# Patient Record
Sex: FEMALE | Race: BLACK OR AFRICAN AMERICAN | Employment: FULL TIME | URBAN - METROPOLITAN AREA
[De-identification: names, ages, dates, MRNs, and addresses within clinical notes are randomized per-mention and may not be internally consistent; named-entity substitution may affect disease eponyms.]

---

## 2021-02-22 ENCOUNTER — HOSPITAL ENCOUNTER (OUTPATIENT)
Dept: GENERAL RADIOLOGY | Age: 56
Discharge: HOME | End: 2021-02-24
Payer: COMMERCIAL

## 2021-02-22 ENCOUNTER — HOSPITAL ENCOUNTER (OUTPATIENT)
Age: 56
Discharge: HOME | End: 2021-02-24
Payer: COMMERCIAL

## 2021-02-22 DIAGNOSIS — R05.9 COUGH: ICD-10-CM

## 2021-02-22 PROCEDURE — 71046 X-RAY EXAM CHEST 2 VIEWS: CPT

## 2022-08-27 ENCOUNTER — APPOINTMENT (OUTPATIENT)
Dept: ULTRASOUND IMAGING | Age: 57
End: 2022-08-27
Payer: COMMERCIAL

## 2022-08-27 ENCOUNTER — APPOINTMENT (OUTPATIENT)
Dept: GENERAL RADIOLOGY | Age: 57
End: 2022-08-27
Payer: COMMERCIAL

## 2022-08-27 ENCOUNTER — HOSPITAL ENCOUNTER (EMERGENCY)
Age: 57
Discharge: HOME | End: 2022-08-27
Attending: EMERGENCY MEDICINE
Payer: COMMERCIAL

## 2022-08-27 VITALS
BODY MASS INDEX: 37.67 KG/M2 | OXYGEN SATURATION: 98 % | HEART RATE: 60 BPM | DIASTOLIC BLOOD PRESSURE: 59 MMHG | SYSTOLIC BLOOD PRESSURE: 135 MMHG | RESPIRATION RATE: 16 BRPM | HEIGHT: 67 IN | TEMPERATURE: 97.6 F | WEIGHT: 240 LBS

## 2022-08-27 DIAGNOSIS — M54.32 LEFT SIDED SCIATICA: Primary | ICD-10-CM

## 2022-08-27 DIAGNOSIS — S16.1XXA STRAIN OF NECK MUSCLE, INITIAL ENCOUNTER: ICD-10-CM

## 2022-08-27 LAB
ANION GAP SERPL CALCULATED.3IONS-SCNC: 9 MMOL/L (ref 7–16)
BUN BLDV-MCNC: 17 MG/DL (ref 6–20)
CALCIUM SERPL-MCNC: 9.1 MG/DL (ref 8.6–10.2)
CHLORIDE BLD-SCNC: 106 MMOL/L (ref 98–107)
CO2: 27 MMOL/L (ref 22–29)
CREAT SERPL-MCNC: 1.1 MG/DL (ref 0.5–1)
D DIMER: 207 NG/ML DDU
EKG ATRIAL RATE: 59 BPM
EKG P AXIS: 8 DEGREES
EKG P-R INTERVAL: 168 MS
EKG Q-T INTERVAL: 414 MS
EKG QRS DURATION: 68 MS
EKG QTC CALCULATION (BAZETT): 409 MS
EKG R AXIS: 52 DEGREES
EKG T AXIS: 20 DEGREES
EKG VENTRICULAR RATE: 59 BPM
GFR AFRICAN AMERICAN: >60
GFR NON-AFRICAN AMERICAN: >60 ML/MIN/1.73
GLUCOSE BLD-MCNC: 91 MG/DL (ref 74–99)
HCT VFR BLD CALC: 40 % (ref 34–48)
HEMOGLOBIN: 12.9 G/DL (ref 11.5–15.5)
MCH RBC QN AUTO: 28.7 PG (ref 26–35)
MCHC RBC AUTO-ENTMCNC: 32.3 % (ref 32–34.5)
MCV RBC AUTO: 89.1 FL (ref 80–99.9)
PDW BLD-RTO: 12.7 FL (ref 11.5–15)
PLATELET # BLD: 224 E9/L (ref 130–450)
PMV BLD AUTO: 10.8 FL (ref 7–12)
POTASSIUM SERPL-SCNC: 4.4 MMOL/L (ref 3.5–5)
RBC # BLD: 4.49 E12/L (ref 3.5–5.5)
SODIUM BLD-SCNC: 142 MMOL/L (ref 132–146)
TROPONIN, HIGH SENSITIVITY: 8 NG/L (ref 0–9)
WBC # BLD: 5.5 E9/L (ref 4.5–11.5)

## 2022-08-27 PROCEDURE — 99285 EMERGENCY DEPT VISIT HI MDM: CPT

## 2022-08-27 PROCEDURE — 93005 ELECTROCARDIOGRAM TRACING: CPT | Performed by: EMERGENCY MEDICINE

## 2022-08-27 PROCEDURE — 96374 THER/PROPH/DIAG INJ IV PUSH: CPT

## 2022-08-27 PROCEDURE — 96372 THER/PROPH/DIAG INJ SC/IM: CPT

## 2022-08-27 PROCEDURE — 73030 X-RAY EXAM OF SHOULDER: CPT

## 2022-08-27 PROCEDURE — 85027 COMPLETE CBC AUTOMATED: CPT

## 2022-08-27 PROCEDURE — 71046 X-RAY EXAM CHEST 2 VIEWS: CPT

## 2022-08-27 PROCEDURE — 85378 FIBRIN DEGRADE SEMIQUANT: CPT

## 2022-08-27 PROCEDURE — 84484 ASSAY OF TROPONIN QUANT: CPT

## 2022-08-27 PROCEDURE — 6360000002 HC RX W HCPCS: Performed by: EMERGENCY MEDICINE

## 2022-08-27 PROCEDURE — 93971 EXTREMITY STUDY: CPT

## 2022-08-27 PROCEDURE — 80048 BASIC METABOLIC PNL TOTAL CA: CPT

## 2022-08-27 RX ORDER — LISINOPRIL AND HYDROCHLOROTHIAZIDE 12.5; 1 MG/1; MG/1
1 TABLET ORAL DAILY
COMMUNITY

## 2022-08-27 RX ORDER — KETOROLAC TROMETHAMINE 30 MG/ML
30 INJECTION, SOLUTION INTRAMUSCULAR; INTRAVENOUS ONCE
Status: COMPLETED | OUTPATIENT
Start: 2022-08-27 | End: 2022-08-27

## 2022-08-27 RX ORDER — CYCLOBENZAPRINE HCL 10 MG
10 TABLET ORAL 3 TIMES DAILY PRN
Qty: 21 TABLET | Refills: 0 | Status: SHIPPED | OUTPATIENT
Start: 2022-08-27 | End: 2022-09-06

## 2022-08-27 RX ORDER — PREDNISONE 10 MG/1
TABLET ORAL
Qty: 20 TABLET | Refills: 0 | Status: SHIPPED | OUTPATIENT
Start: 2022-08-27 | End: 2022-09-06

## 2022-08-27 RX ORDER — ORPHENADRINE CITRATE 30 MG/ML
60 INJECTION INTRAMUSCULAR; INTRAVENOUS ONCE
Status: COMPLETED | OUTPATIENT
Start: 2022-08-27 | End: 2022-08-27

## 2022-08-27 RX ADMIN — ORPHENADRINE CITRATE 60 MG: 30 INJECTION INTRAMUSCULAR; INTRAVENOUS at 08:41

## 2022-08-27 RX ADMIN — KETOROLAC TROMETHAMINE 30 MG: 30 INJECTION, SOLUTION INTRAMUSCULAR; INTRAVENOUS at 08:42

## 2022-08-27 ASSESSMENT — ENCOUNTER SYMPTOMS
DIARRHEA: 0
ABDOMINAL DISTENTION: 0
EYE PAIN: 0
NAUSEA: 0
SINUS PRESSURE: 0
VOMITING: 0
SORE THROAT: 0
EYE REDNESS: 0
WHEEZING: 0
BACK PAIN: 0
COUGH: 0
EYE DISCHARGE: 0
SHORTNESS OF BREATH: 0

## 2022-08-27 ASSESSMENT — PAIN DESCRIPTION - LOCATION
LOCATION: NECK;SHOULDER;LEG
LOCATION: NECK;SHOULDER;LEG

## 2022-08-27 ASSESSMENT — PAIN SCALES - GENERAL
PAINLEVEL_OUTOF10: 7
PAINLEVEL_OUTOF10: 7

## 2022-08-27 ASSESSMENT — PAIN DESCRIPTION - FREQUENCY: FREQUENCY: CONTINUOUS

## 2022-08-27 ASSESSMENT — PAIN DESCRIPTION - ORIENTATION
ORIENTATION: LEFT
ORIENTATION: LEFT

## 2022-08-27 ASSESSMENT — PAIN DESCRIPTION - DESCRIPTORS: DESCRIPTORS: ACHING;SHARP

## 2022-08-27 ASSESSMENT — PAIN - FUNCTIONAL ASSESSMENT: PAIN_FUNCTIONAL_ASSESSMENT: 0-10

## 2022-08-27 ASSESSMENT — PAIN DESCRIPTION - PAIN TYPE: TYPE: ACUTE PAIN

## 2022-08-27 NOTE — ED PROVIDER NOTES
Patient is a 62 y/o female who presents to the ED with left leg pain and left shoulder pain. Patient states she had onset of cramping in her left leg two days ago. She states that she has since had pain behind her left knee which shoots up her left leg. She also reports left shoulder pain after moving a case of water yesterday. She has had mild chest pain. She denies any shortness of breath. She states that she has taken Tylenol and Aleve and the pain has improved. Currently, her pain is 7/10. Somewhat sudden onset, persistent, moderate severity, radiating down the left leg. Review of Systems   Constitutional:  Negative for chills and fever. HENT:  Negative for ear pain, sinus pressure and sore throat. Eyes:  Negative for pain, discharge and redness. Respiratory:  Negative for cough, shortness of breath and wheezing. Cardiovascular:  Positive for chest pain. Gastrointestinal:  Negative for abdominal distention, diarrhea, nausea and vomiting. Genitourinary:  Negative for dysuria and frequency. Musculoskeletal:  Positive for arthralgias and myalgias. Negative for back pain. Skin:  Negative for rash and wound. Neurological:  Negative for weakness and headaches. Hematological:  Negative for adenopathy. All other systems reviewed and are negative. Physical Exam  Vitals and nursing note reviewed. Constitutional:       General: She is not in acute distress. HENT:      Head: Normocephalic and atraumatic. Right Ear: External ear normal.      Left Ear: External ear normal.      Nose: Nose normal.      Mouth/Throat:      Mouth: Mucous membranes are moist.   Eyes:      Conjunctiva/sclera: Conjunctivae normal.      Pupils: Pupils are equal, round, and reactive to light. Cardiovascular:      Rate and Rhythm: Normal rate and regular rhythm. Heart sounds: No murmur heard. Pulmonary:      Effort: Pulmonary effort is normal. No respiratory distress.       Breath sounds: Normal breath sounds. No stridor. No wheezing, rhonchi or rales. Abdominal:      General: Bowel sounds are normal. There is no distension. Palpations: Abdomen is soft. Tenderness: There is no abdominal tenderness. There is no guarding. Musculoskeletal:         General: Normal range of motion. Left shoulder: Normal. No swelling, deformity, tenderness or bony tenderness. Cervical back: Normal range of motion and neck supple. Left upper leg: Normal. No swelling, deformity, tenderness or bony tenderness. Left lower leg: No swelling, deformity, tenderness or bony tenderness. No edema. Skin:     General: Skin is warm and dry. Findings: No rash. Neurological:      Mental Status: She is alert and oriented to person, place, and time. Procedures     [facility]       ED Course as of 08/30/22 0209   Sat Aug 27, 2022   9481 Patient reports that she is had left foot leg. Back clear for a few days. Is from bending down and picking something up she reports. She is also had pain to the left musculature of the neck, the left upper arm and the left hand. This is new in the last day or 2. No recent travel or surgeries immobilizations, not a chemotherapy currently. 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 Currently awake and alert and oriented, normally takes ibuprofen or aspirin which helps for her discomfort. [SO]      ED Course User Index  [SO] Mliss Solders, DO        EKG:  Sinus bradycarida with ventricular rate of 59. TX interval, QRS duration and QT interval within normal range. Normal axis. No ST segment abnormalities to suggest acute ischemia.    6:52 AM EDT  I have signed this patient out to the oncoming physician, Dr. Ngoc Anthony. I have discussed the patient's initial exam, treatment and plan of care with the on coming physician. I have notified the patient / family of the change in treating physician and answered their questions to this point.       ED Course as of 08/30/22 4385   Sat Aug 27, 2022   0044 Patient reports that she is had left foot leg. Back clear for a few days. Is from bending down and picking something up she reports. She is also had pain to the left musculature of the neck, the left upper arm and the left hand. This is new in the last day or 2. No recent travel or surgeries immobilizations, not a chemotherapy currently. 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 Currently awake and alert and oriented, normally takes ibuprofen or aspirin which helps for her discomfort. [SO]      ED Course User Index  [SO] Corinne Jean Claude, DO       --------------------------------------------- PAST HISTORY ---------------------------------------------  Past Medical History:  has a past medical history of Hypertension. Past Surgical History:  has a past surgical history that includes Hysterectomy. Social History:  reports that she has never smoked. She has never used smokeless tobacco. She reports current alcohol use. She reports that she does not use drugs. Family History: family history is not on file. The patients home medications have been reviewed. Allergies: Patient has no known allergies.     -------------------------------------------------- RESULTS -------------------------------------------------  Labs:  Results for orders placed or performed during the hospital encounter of 24/16/28   Basic metabolic panel   Result Value Ref Range    Sodium 142 132 - 146 mmol/L    Potassium 4.4 3.5 - 5.0 mmol/L    Chloride 106 98 - 107 mmol/L    CO2 27 22 - 29 mmol/L    Anion Gap 9 7 - 16 mmol/L    Glucose 91 74 - 99 mg/dL    BUN 17 6 - 20 mg/dL    Creatinine 1.1 (H) 0.5 - 1.0 mg/dL    GFR Non-African American >60 >=60 mL/min/1.73    GFR African American >60     Calcium 9.1 8.6 - 10.2 mg/dL   CBC   Result Value Ref Range    WBC 5.5 4.5 - 11.5 E9/L    RBC 4.49 3.50 - 5.50 E12/L    Hemoglobin 12.9 11.5 - 15.5 g/dL Hematocrit 40.0 34.0 - 48.0 %    MCV 89.1 80.0 - 99.9 fL    MCH 28.7 26.0 - 35.0 pg    MCHC 32.3 32.0 - 34.5 %    RDW 12.7 11.5 - 15.0 fL    Platelets 375 375 - 518 E9/L    MPV 10.8 7.0 - 12.0 fL   Troponin   Result Value Ref Range    Troponin, High Sensitivity 8 0 - 9 ng/L   D-Dimer, Quantitative   Result Value Ref Range    D-Dimer, Quant 207 ng/mL DDU   EKG 12 Lead   Result Value Ref Range    Ventricular Rate 59 BPM    Atrial Rate 59 BPM    P-R Interval 168 ms    QRS Duration 68 ms    Q-T Interval 414 ms    QTc Calculation (Bazett) 409 ms    P Axis 8 degrees    R Axis 52 degrees    T Axis 20 degrees       Radiology:  US DUP LOWER EXTREMITY LEFT LIZBETH   Final Result   No evidence of DVT in the left lower extremity. XR SHOULDER LEFT (MIN 2 VIEWS)   Final Result   No acute osseous findings of the left shoulder. XR CHEST (2 VW)   Final Result   No acute cardiopulmonary process. Thank you very much for this referral!             ------------------------- NURSING NOTES AND VITALS REVIEWED ---------------------------  Date / Time Roomed:  8/27/2022  4:33 AM  ED Bed Assignment:  17/17    The nursing notes within the ED encounter and vital signs as below have been reviewed. BP (!) 135/59   Pulse 60   Temp 97.6 °F (36.4 °C)   Resp 16   Ht 5' 6.5\" (1.689 m)   Wt 240 lb (108.9 kg)   SpO2 98%   BMI 38.16 kg/m²   Oxygen Saturation Interpretation: Normal      ------------------------------------------ PROGRESS NOTES ------------------------------------------  I have spoken with the patient and discussed todays results, in addition to providing specific details for the plan of care and counseling regarding the diagnosis and prognosis. Their questions are answered at this time and they are agreeable with the plan. I discussed at length with them reasons for immediate return here for re evaluation.  They will followup with primary care by calling their office tomorrow. --------------------------------- ADDITIONAL PROVIDER NOTES ---------------------------------  At this time the patient is without objective evidence of an acute process requiring hospitalization or inpatient management. They have remained hemodynamically stable throughout their entire ED visit and are stable for discharge with outpatient follow-up. The plan has been discussed in detail and they are aware of the specific conditions for emergent return, as well as the importance of follow-up. Discharge Medication List as of 8/27/2022  9:39 AM        START taking these medications    Details   predniSONE (DELTASONE) 10 MG tablet Take 40mg po qd x 5 days  QS for 5 days, Disp-20 tablet, R-0Print      cyclobenzaprine (FLEXERIL) 10 MG tablet Take 1 tablet by mouth 3 times daily as needed for Muscle spasms, Disp-21 tablet, R-0Print             Diagnosis:  1. Left sided sciatica    2. Strain of neck muscle, initial encounter        Disposition:  Patient's disposition: Discharge to home  Patient's condition is stable.             Eb Foster DO  08/30/22 0209

## 2022-10-19 ENCOUNTER — TELEPHONE (OUTPATIENT)
Dept: SLEEP CENTER | Age: 57
End: 2022-10-19

## 2022-11-01 ENCOUNTER — TELEPHONE (OUTPATIENT)
Dept: SLEEP CENTER | Age: 57
End: 2022-11-01

## 2022-11-02 ENCOUNTER — HOSPITAL ENCOUNTER (OUTPATIENT)
Dept: SLEEP CENTER | Age: 57
Discharge: HOME | End: 2022-11-02
Payer: COMMERCIAL

## 2022-11-02 DIAGNOSIS — G47.33 OSA (OBSTRUCTIVE SLEEP APNEA): Primary | ICD-10-CM

## 2022-11-02 PROCEDURE — 95800 SLP STDY UNATTENDED: CPT

## 2022-11-12 PROCEDURE — 95806 SLEEP STUDY UNATT&RESP EFFT: CPT | Performed by: INTERNAL MEDICINE

## 2022-11-13 NOTE — PROCEDURES
[address] [place] [address], [address] [place] [address]              HOME SLEEP STUDY REPORT       PATIENT NAME: Morelia Vigil               : 1965        MED REC NO:  [id number]     PROVIDER:  Caitlyn Delatorre DO  DATE OF STUDY: 22     SERVICE PROVIDER:  Roselyn Awad     REFERRING PROVIDER:   GIOVANNI Hawthorne    AGE: 61 y.o. SEX: female        HEIGHT: 5 ft   6 in         WEIGHT: 240 lbs          BMI: 38.6 kg/m2    NECK CIRCUMFERENCE: 17 in    Symptoms: Morning headaches, difficulty maintaining sleep, wakes choking/coughing. The [place] Sleepiness Scale was 18 out of 24 (scores above or equal to 10 are suggestive of hypersomnolence). Indication: Evaluate for obstructive sleep apnea. Medical History:  Sinus problems, hypertension, hyperlipidemia. Medications: Lisinopril-hydrochlorothiazide. DESCRIPTION: Unattended, full night Home Sleep Apnea testing (HSAT) was performed using an FDA approved  WatchPAT device. Peripheral Arterial Tonometry, pulse rate, oxygen saturation, total sleep time, sleep staging, body  position, and snoring were recorded. Reported pRDI/pAHI calculations in this report are scored based on 3%  desaturations according to AASM scoring criteria. Recording started at 11:25:20 PM and ended at 5:31:31 AM. Of  the 6 hrs, 6 min of recording time, the patient was asleep for 5 hrs, 32 min. Respiratory indices were calculated using  the technically valid sleep time of 4 hrs, 15 min. REM sleep comprised 27.6% of the total sleep time. FINDINGS:  RESPIRATORY MONITORING:  : The apnea/hypopnea index (pAHI) was 23.7. The respiratory disturbance index  (pRDI) was 26.0. The REM pRDI was 41.3 and the non-REM pRDI was 21.0. The patient slept in the supine  position for 178.2 minutes (53.6% of the total sleep time), and the supine pRDI was 29.3.  The patient slept in the  non-supine position for 154.0 minutes (46.4% of the total sleep time), and the non-supine pRDI was 19.6. The 3%  oxygen desaturation index (ISADORA) was 22.8. The average oxygen saturation was 95%. The lowest oxygen saturation  was 88%. Oxygen saturations were less than or equal to 88% for 0.1 minutes or 0.0% of the total oxygen saturation  evaluation period. Snoring occurred for 64.8 minutes, or 19.5% of the total valid sleep time. PULSE: The average heart rate during recording was 89 beats per minute. IMPRESSION:   1. This study is consistent with moderate obstructive sleep apnea that is severe during REM sleep. Of note, the severity of this patient's sleep disordered breathing was likely underestimated as home sleep studies are inherently less sensitive. RECOMMENDATIONS:    1. Auto-CPAP therapy at settings of 5-20 cm of water is recommended, to be ordered by the referring provider. Equipment ordering information is below. 2.  Per insurance requirements, the patient should be seen in clinical follow up within 3 months of receiving auto-CPAP therapy in order to document adherence to therapy, assess efficacy of the prescribed settings (as based upon a residual AHI of less than or equal to 5 on the device's remote download), and evaluate resolution of sleep-related complaints. 3.  The patient should be strongly counseled against driving while drowsy. 4.  Weight loss efforts should be encouraged, as the severity of obstructive sleep apnea may improve although no guarantee of cure can be made. 5.  The patient may be referred to the [facility] for ongoing management of obstructive sleep apnea and PAP therapy, if the referring provider so desires. EQUIPMENT ORDERING INFORMATION:  DEVICE:  Auto CPAP with heated humidification and a remote modem. SETTINGS:  5 to 20 cm of water with EPR of 3. MASK TYPE:  Full-face mask, or alternative as chosen by patient.     MASK SIZE:  To be fit by DME. SUPPLEMENTAL OXYGEN:  None.

## 2022-12-19 ENCOUNTER — TELEPHONE (OUTPATIENT)
Dept: SLEEP CENTER | Age: 57
End: 2022-12-19